# Patient Record
Sex: FEMALE | Race: WHITE | Employment: FULL TIME | URBAN - NONMETROPOLITAN AREA
[De-identification: names, ages, dates, MRNs, and addresses within clinical notes are randomized per-mention and may not be internally consistent; named-entity substitution may affect disease eponyms.]

---

## 2020-03-17 ENCOUNTER — TELEPHONE (OUTPATIENT)
Dept: FAMILY MEDICINE | Facility: OTHER | Age: 25
End: 2020-03-17

## 2020-03-17 NOTE — TELEPHONE ENCOUNTER
Please triage this patient.  She was on the schedule for headaches and cramping.  Please ask her to call her primary provider.

## 2020-03-17 NOTE — TELEPHONE ENCOUNTER
I called this patient with the following per Roni Chin PA-C:  Please call the patient to inform her that based off of the symptoms she reported she likely has a viral illness. While I appreciate the consideration of the [facility] system for her ongoing care, at this time I do not recommend that she be seen in clinic.      Please let her know that in response to the growing number of Covid-19 cases [place] has decided to restrict access to clinic to essential visits only. This is intended to protect patients from exposure to this or other viruses.      I would like the patient to do the following recommendations:     1. Maintain a bland diet (bananas, applesauce, rice, toast, etc)              A) Avoid irritating substances such as spicy  foods, fast food, alcohol, soda or similar             sugary beverages     2. Maintain adequate fluid intake (water preferred)      3. She may use bismuth subsalicylate or loperamide to help control symptoms of diarrhea and abdominal cramping.      4. Tylenol or ibuprofen for fever, pain, headache     The typical viral illness takes 7-10 days to run its course. This is the expected timeline for her symptoms to resolve.      Concerning symptoms would be worst headache of life, vomiting or defecating blood or increasing frequency of loose stools. Fever in excess of 100.4 F.      Thank-you,  Roni Chin PA-C on 3/17/2020 at 3:13 PM

## 2020-03-17 NOTE — TELEPHONE ENCOUNTER
Please call the patient to inform her that based off of the symptoms she reported she likely has a viral illness. While I appreciate the consideration of the [facility] system for her ongoing care, at this time I do not recommend that she be seen in clinic.     Please let her know that in response to the growing number of Covid-19 cases [place] has decided to restrict access to clinic to essential visits only. This is intended to protect patients from exposure to this or other viruses.     I would like the patient to do the following recommendations:    1. Maintain a bland diet (bananas, applesauce, rice, toast, etc)   A) Avoid irritating substances such as spicy  foods, fast food, alcohol, soda or similar  sugary beverages    2. Maintain adequate fluid intake (water preferred)     3. She may use bismuth subsalicylate or loperamide to help control symptoms of diarrhea and abdominal cramping.     4. Tylenol or ibuprofen for fever, pain, headache    The typical viral illness takes 7-10 days to run its course. This is the expected timeline for her symptoms to resolve.     Concerning symptoms would be worst headache of life, vomiting or defecating blood or increasing frequency of loose stools. Fever in excess of 100.4 F.     Thank-you,  Roni Chin PA-C on 3/17/2020 at 3:13 PM

## 2020-03-17 NOTE — TELEPHONE ENCOUNTER
Called and spoke to the patient. She said she started getting sick on Sunday. She thought maybe she was hung over but as the day went on her symptoms continued to get worse. Patient has been feeling hot and cold, some vomiting, diarrhea, headache, and dizziness. Cough but patient said she smokes so that is normal for her. No longer vomiting but does have diarrhea. Trying to push fluids. Urinating well. Feels weak. Has abdominal cramping across her entire lower abdomin. Feels like menstrual cramps but she does not have her period.     Patient really wants to be seen. Discussed clinic visit vs ED. Patient said she really does not want to be seen in the ED. She said she used to go to another clinic but moved to this area and does not have a primary provider. She is wondering if Roni would still see her today as she feels pretty crappy.     Will route to provider to advise.     Ruth Ann Abdi, QUYNHN, RN  [facility]

## 2020-03-23 ENCOUNTER — VIRTUAL VISIT (OUTPATIENT)
Dept: FAMILY MEDICINE | Facility: OTHER | Age: 25
End: 2020-03-23

## 2020-03-23 NOTE — PROGRESS NOTES
"Date: 2020 13:34:18  Clinician: Shira Mansfield  Clinician NPI: [id number]  Patient: Josefina Kim  Patient : 1995  Patient Address: [address] [place] [address]  Patient Phone: [phone]  Visit Protocol: URI  Patient Summary:  Josefina is a 25 year old ( : 1995 ) female who initiated a Visit for COVID-19 (Coronavirus) evaluation and screening. When asked the question \"Please sign me up to receive news, health information and promotions from [employer].\", Josefina responded \"No\".    Josefina states her symptoms started gradually 3-6 days ago.   Her symptoms consist of rhinitis, enlarged lymph nodes, facial pain or pressure, myalgia, a sore throat, a cough, nasal congestion, malaise, and a headache. She is experiencing difficulty breathing due to nasal congestion but she is not short of breath.   Symptom details     Nasal secretions: The color of her mucus is blood-tinged.    Cough: Josefina coughs every 5-10 minutes and her cough is not more bothersome at night. Phlegm comes into her throat when she coughs. She believes her cough is caused by post-nasal drip. The color of the phlegm is blood-tinged.     Sore throat: Josefina reports having moderate throat pain (4-6 on a 10 point pain scale), does not have exudate on her tonsils, and can swallow liquids. The lymph nodes in her neck are enlarged. A rash has not appeared on the skin since the sore throat started.     Facial pain or pressure: The facial pain or pressure does not feel worse when bending or leaning forward.     Headache: She states the headache is mild (1-3 on a 10 point pain scale).      Josefina denies having ear pain, wheezing, chills, teeth pain, and fever. She also denies double sickening (worsening symptoms after initial improvement), taking antibiotic medication for the symptoms, and having recent facial or sinus surgery in the past 60 days.   Precipitating events  Within the past week, Josefina has not been " exposed to someone with strep throat. She has not recently been exposed to someone with influenza. Josefina has not been in close contact with any high risk individuals.   Pertinent COVID-19 (Coronavirus) information  Josefina has not traveled internationally or to the areas where COVID-19 (Coronavirus) is widespread, including cruise ship travel in the last 14 days before the start of her symptoms.   Josefina has not had a close contact with a laboratory-confirmed COVID-19 patient within 14 days of symptom onset. She has had a close contact with a suspected COVID-19 patient within 14 days of symptom onset. Additional information about contact with COVID-19 (Coronavirus) patient as reported by the patient (free text): Coworker, Michael Capone, returned to work last week after traveling to [facility] on vacation, started getting out of breath, and is self quarantined to home for 14 days by his doctor.   Josefina is not a healthcare worker and does not work in a healthcare facility.   Pertinent medical history  Josefina does not get yeast infections when she takes antibiotics.   Josefina needs a return to work/school note.   Weight: 186 lbs   Josefina smokes or uses smokeless tobacco.   She denies pregnancy and denies breastfeeding. She has menstruated in the past month.   Weight: 186 lbs    MEDICATIONS: Valtrex oral, ALLERGIES: NKDA  Clinician Response:  Dear Josefina,   Based on the information you have provided, you do have symptoms that are consistent with Coronavirus (COVID-19).  The coronavirus causes mild to severe respiratory illness with the most common symptoms including fever, cough and difficulty breathing. Unfortunately, many viruses cause similar symptoms and it can be difficult to distinguish between viruses, especially in mild cases, so we are presuming that anyone with cough or fever has coronavirus at this time.  Coronavirus/COVID-19 has reached the point of community spread in [place], meaning that we  are finding the virus in people with no known exposure risk for kenton the virus. Given the increasing commonness of coronavirus in the community we are no longer testing patients who are not critically ill.  If you are a health care worker, you should refer to your employee health office for instructions about testing and returning to work.  For everyone else who has cough or fever, you should assume you are infected with coronavirus. Since you will not be tested but have symptoms that may be consistent with coronavirus, the CDC recommends you stay in self-isolation until these three things have happened:    You have had no fever for at least 72 hours (that is three full days of no fever without the use of medicine that reduces fevers)    AND   Other symptoms have improved (for example, when your cough or shortness of breath have improved)   AND   At least 7 days have passed since your symptoms first appeared.   How to Isolate:   Isolate yourself at home.  Do Not allow any visitors  Do Not go to work or school  Do Not go to [employer],  centers, shopping, or other public places.  Do Not shake hands.  Avoid close contact with others (hugging, kissing).   Protect Others:   Cover Your Mouth and Nose with a mask, disposable tissue or wash cloth to avoid spreading germs to others.  Wash your hands and face frequently with soap and water.   We know it can be scary to hear that you might have COVID-19. Our team can help track your symptoms and make sure you are doing ok over the next two weeks using a program called [employer] to keep in touch. When you receive an email from [employer], please consider enrolling in our monitoring program. There is no cost to you for monitoring. Here is a URL where you can learn more: [email or web address]  Managing Symptoms:   At this time, we primarily recommend Tylenol (Acetaminophen) for fever or pain. If you have liver or kidney problems, contact your primary  care provider for instructions on use of tylenol. Adults can take 650 mg (two 325 mg pills) by mouth every 4-6 hours as needed OR 1,000 mg (two 500 mg pills) every 8 hours as needed. MAXIMUM DAILY DOSE: 3,000mg. For children, refer to dosing on bottle based on age or weight.   If you develop significant shortness of breath that prevents you from doing normal activities, please call 911 or proceed to the nearest emergency room and alert them immediately that you have been in self-isolation for possible coronavirus.  For more information about COVID19 and options for caring for yourself at home, please visit the CDC website at https://www.cdc.gov/coronavirus/2019-ncov/about/steps-when-sick.htmlFor more options for care at [facility], please visit our website at [email or web address]    Diagnosis: Cough  Diagnosis ICD: R05

## 2020-03-27 ENCOUNTER — VIRTUAL VISIT (OUTPATIENT)
Dept: FAMILY MEDICINE | Facility: OTHER | Age: 25
End: 2020-03-27

## 2020-03-27 NOTE — PROGRESS NOTES
"Date: 2020 08:50:17  Clinician: Julian Little MD  Clinician NPI: [id number]  Patient: Josefina Kim  Patient : 1995  Patient Address: [address] [place] [address]  Patient Phone: [phone]  Visit Protocol: URI  Patient Summary:  Josefina is a 25 year old ( : 1995 ) female who initiated a Visit for COVID-19 (Coronavirus) evaluation and screening. When asked the question \"Please sign me up to receive news, health information and promotions from [employer].\", Josefina responded \"No\".    Josefina states her symptoms started gradually 7-9 days ago. After her symptoms started, they improved and then got worse again.   Her symptoms consist of a cough, malaise, a headache, myalgia, and chills. She is experiencing mild difficulty breathing with activities but can speak normally in full sentences. Josefina also feels feverish but was unable to measure her temperature.   Symptom details     Cough: Josefina coughs every 5-10 minutes and her cough is not more bothersome at night. Phlegm does not come into her throat when she coughs. She does not believe her cough is caused by post-nasal drip.     Headache: She states the headache is mild (1-3 on a 10 point pain scale).      Josefina denies having ear pain, rhinitis, wheezing, sore throat, nasal congestion, enlarged lymph nodes, teeth pain, and facial pain or pressure. She also denies taking antibiotic medication for the symptoms, having recent facial or sinus surgery in the past 60 days, and having a sinus infection within the past year.   Precipitating events  She has recently been exposed to someone with influenza. Josefina has not been in close contact with any high risk individuals.   Pertinent COVID-19 (Coronavirus) information  Josefina has not traveled internationally or to the areas where COVID-19 (Coronavirus) is widespread, including cruise ship travel in the last 14 days before the start of her symptoms.   Josefina has not had a " close contact with a laboratory-confirmed COVID-19 patient within 14 days of symptom onset. She has had a close contact with a suspected COVID-19 patient within 14 days of symptom onset. Additional information about contact with COVID-19 (Coronavirus) patient as reported by the patient (free text): Coworker returning to work from a trip to [facility]. Also, two people in my treatment group are quarantined to home due to suspected covid   Josefina is not a healthcare worker or a  and does not work in a healthcare facility. She does not live with a healthcare worker.   Pertinent medical history  Josefina does not get yeast infections when she takes antibiotics.   Josefina needs a return to work/school note.   Weight: 180 lbs   Josefina smokes or uses smokeless tobacco.   She denies pregnancy and denies breastfeeding. She has menstruated in the past month.   Additional information as reported by the patient (free text): Initially I thought it was possible that I had a cold and now my symptoms are different. My cough is dry and deep in my chest with nothing coming out. I've also been more dizzy than normal- I take supplements for being low on iron.   Weight: 180 lbs    MEDICATIONS: Valtrex oral, ALLERGIES: NKDA  Clinician Response:  Dear Josefina,   Based on the information you have provided, you do have symptoms that are consistent with Coronavirus (COVID-19).   The coronavirus causes mild to severe respiratory illness with the most common symptoms including fever, cough and difficulty breathing. Unfortunately, many viruses cause similar symptoms and it can be difficult to distinguish between viruses, especially in mild cases, so we are presuming that anyone with cough or fever has coronavirus at this time.  Coronavirus/COVID-19 has reached the point of community spread in [place], meaning that we are finding the virus in people with no known exposure risk for kenton the virus. Given the increasing  commonness of coronavirus in the community we are no longer testing patients who are not critically ill.  If you are a health care worker, you should refer to your employee health office for instructions about testing and returning to work.  For everyone else who has cough or fever, you should assume you are infected with coronavirus. Since you will not be tested but have symptoms that may be consistent with coronavirus, the CDC recommends you stay in self-isolation until these three things have happened:    You have had no fever for at least 72 hours (that is three full days of no fever without the use of medicine that reduces fevers)    AND   Other symptoms have improved (for example, when your cough or shortness of breath have improved)   AND   At least 7 days have passed since your symptoms first appeared.   How to Isolate:    Isolate yourself at home.   Do Not allow any visitors  Do Not go to work or school  Do Not go to [employer],  centers, shopping, or other public places.  Do Not shake hands.  Avoid close contact with others (hugging, kissing).   Protect Others:    Cover Your Mouth and Nose with a mask, disposable tissue or wash cloth to avoid spreading germs to others.  Wash your hands and face frequently with soap and water.   Managing Symptoms:    At this time, we primarily recommend Tylenol (Acetaminophen) for fever or pain. If you have liver or kidney problems, contact your primary care provider for instructions on use of tylenol. Adults can take 650 mg (two 325 mg pills) by mouth every 4-6 hours as needed OR 1,000 mg (two 500 mg pills) every 8 hours as needed. MAXIMUM DAILY DOSE: 3,000mg. For children, refer to dosing on bottle based on age or weight.   If you develop significant shortness of breath that prevents you from doing normal activities, please call 911 or proceed to the nearest emergency room and alert them immediately that you have been in self-isolation for possible coronavirus.   If  you have a higher risk medical condition such as cancer, heart failure, end stage renal disease on dialysis or have a transplant, please reach out to your specialist's clinic to advise them of your [facility] visit should you not improve within the next two days.  For more information about COVID19 and options for caring for yourself at home, please visit the CDC website at https://www.cdc.gov/coronavirus/2019-ncov/about/steps-when-sick.htmlFor more options for care at [facility], please visit our website at [email or web address]     Diagnosis: Cough  Diagnosis ICD: R05

## 2020-04-03 ENCOUNTER — VIRTUAL VISIT (OUTPATIENT)
Dept: FAMILY MEDICINE | Facility: CLINIC | Age: 25
End: 2020-04-03
Payer: COMMERCIAL

## 2020-04-03 ENCOUNTER — NURSE TRIAGE (OUTPATIENT)
Dept: NURSING | Facility: CLINIC | Age: 25
End: 2020-04-03

## 2020-04-03 DIAGNOSIS — R05.9 COUGH: Primary | ICD-10-CM

## 2020-04-03 PROCEDURE — 99213 OFFICE O/P EST LOW 20 MIN: CPT | Mod: TEL | Performed by: NURSE PRACTITIONER

## 2020-04-03 NOTE — LETTER
[address] [address]  Phone: [phone]  Fax: [phone]    April 3, 2020        Josefina Kim  [id number] [facility] [address]          To whom it may concern:    RE: Josefina Kim    Patient was evaluated by our clinic and missed work.  She is cleared to return to work on 4/10/2020.     Please contact me for questions or concerns.      Sincerely,        PEMA Linn CNP

## 2020-04-03 NOTE — Clinical Note
Please fax/print/send as needed.     Patient was wondering about my chart sign up?  Can we check on that as well?    Thanks    Millie

## 2020-04-03 NOTE — TELEPHONE ENCOUNTER
Pt called and stated that she had Respiratory symptoms and an cold. She had called [facility] and told to stay quarantined for 7 days, recalled after 7 days and still had symptom. Told to stay quarantined until 04/10/20. Pt asking for a note for her employer to RTWk. Sent to scheduling to assist and gave pt the My Chart #.Went over Covid 19 quarantined instructions.    Reason for Disposition    Health Information question, no triage required and triager able to answer question    Protocols used: INFORMATION ONLY CALL-A-AH      Please use the information at the end of this document to sign up for  [employer] [place] [employer] where you can get your results and a message about those results sent to you through the [employer] application. If you do not have mychart we will call you with your results but it may take longer.    Regardless of if you have been tested or not:  Patient who have symptoms (cough, fever, or shortness of breath), need to isolate for 7 days from when symptoms started AND 72 hours after fever resolves (without fever reducing medications) AND improvement of respiratory symptoms (whichever is longer).      Isolate yourself at home (in own room/own bathroom if possible)    Do Not allow any visitors    Do Not go to work or school    Do Not go to [employer],  centers, shopping, or other public places.    Do Not shake hands.    Avoid close and intimate contact with others (hugging, kissing).    Follow CDC recommendations for household cleaning of frequently touched services.     After the initial 7 days, continue to isolate yourself from household members as much as possible. To continue decrease the risk of community spread and exposure, you and any members of your household should limit activities in public for 14 days after starting home isolation.     Protect Others:    Cover Your Mouth and Nose with a mask, disposable tissue or wash cloth to avoid spreading germs to others.    Wash your hands and face  frequently with soap and water    Call Back If: Breathing difficulty develops or you become worse.  Leonor Casey RN

## 2020-04-03 NOTE — PROGRESS NOTES
"Subjective     Josefina Kim is a 25 year old female who is being evaluated via a billable telephone visit.      The patient has been notified of following:     \"This telephone visit will be conducted via a call between you and your physician/provider. We have found that certain health care needs can be provided without the need for a physical exam.  This service lets us provide the care you need with a short phone conversation.  If a prescription is necessary we can send it directly to your pharmacy.  If lab work is needed we can place an order for that and you can then stop by our lab to have the test done at a later time.    If during the course of the call the physician/provider feels a telephone visit is not appropriate, you will not be charged for this service.\"     Patient has given verbal consent for Telephone visit?  Yes    Josefina Kim complains of   Chief Complaint   Patient presents with     Patient Request for Note/Letter     Evaluated by [facility] for COVID symptoms on 3/23/2020 and 3/27/2020 and she was advised to stay out of work until 4/10/2020.  She needs a note to return to work after that.  Symptoms are improving.  Does not have a thermometer, but does not feel feverish.  Slight cough, this is improved.  Was short of breath last week, this has resolved. No other concerns today.     ALLERGIES  Patient has no known allergies.           Review of Systems   ROS COMP: Constitutional, HEENT, cardiovascular, pulmonary, gi and gu systems are negative, except as otherwise noted.               Assessment/Plan:  1. Cough  Discussed COVID recommendations for returning to work.  Note given to return on 4/10/202 as previously advised.  She will contact the clinic with any new or worsening symptoms.       No follow-ups on file.      Phone call duration:  6 minutes    PEMA Linn CNP      "

## 2020-08-04 ENCOUNTER — VIRTUAL VISIT (OUTPATIENT)
Dept: FAMILY MEDICINE | Facility: CLINIC | Age: 25
End: 2020-08-04
Payer: COMMERCIAL

## 2020-08-04 VITALS — TEMPERATURE: 100.3 F

## 2020-08-04 DIAGNOSIS — R50.9 FEVER AND CHILLS: Primary | ICD-10-CM

## 2020-08-04 PROCEDURE — 99213 OFFICE O/P EST LOW 20 MIN: CPT | Mod: 95 | Performed by: PHYSICIAN ASSISTANT

## 2020-08-04 RX ORDER — VALACYCLOVIR HYDROCHLORIDE 1 G/1
1 TABLET, FILM COATED ORAL DAILY
COMMUNITY
Start: 2020-07-16

## 2020-08-04 SDOH — HEALTH STABILITY: MENTAL HEALTH: HOW OFTEN DO YOU HAVE A DRINK CONTAINING ALCOHOL?: NEVER

## 2020-08-04 ASSESSMENT — PAIN SCALES - GENERAL: PAINLEVEL: NO PAIN (0)

## 2020-08-04 NOTE — LETTER
August 4, 2020      Josefina Kim  [id number] [facility] [address]        To Whom It May Concern,       Josefina was evaluated today, via televisit, for fever and advised to be screened for covid. She has been instructed to quarantine and remain home from work until the covid screen returns. Thankyou for your understanding.       Sincerely,        Roni Chin PA-C

## 2020-08-04 NOTE — NURSING NOTE
Health Maintenance Due   Topic Date Due     PREVENTIVE CARE VISIT  1995     CHLAMYDIA SCREENING  1995     DTAP/TDAP/TD IMMUNIZATION (1 - Tdap) 02/01/2003     HPV IMMUNIZATION (1 - 2-dose series) 02/01/2007     HIV SCREENING  02/01/2011     PAP  02/01/2017     PHQ-2  01/01/2020     Mitzi ARITA LPN

## 2020-08-04 NOTE — PROGRESS NOTES
"Josefina Kim is a 25 year old female who is being evaluated via a billable telephone visit.      The patient has been notified of following:     \"This telephone visit will be conducted via a call between you and your physician/provider. We have found that certain health care needs can be provided without the need for a physical exam.  This service lets us provide the care you need with a short phone conversation.  If a prescription is necessary we can send it directly to your pharmacy.  If lab work is needed we can place an order for that and you can then stop by our lab to have the test done at a later time.    Telephone visits are billed at different rates depending on your insurance coverage. During this emergency period, for some insurers they may be billed the same as an in-person visit.  Please reach out to your insurance provider with any questions.    If during the course of the call the physician/provider feels a telephone visit is not appropriate, you will not be charged for this service.\"    Patient has given verbal consent for Telephone visit?  Yes    What phone number would you like to be contacted at? [phone]    How would you like to obtain your AVS? Toddhart    Subjective     Josefina Kim is a 25 year old female who presents via phone visit today for the following health issues:    HPI    Acute Illness   Acute illness concerns: fever  Onset: today    Fever: YES    Chills/Sweats: YES    Headache (location?): YES - left side forehead    Sinus Pressure:no    Conjunctivitis:  no    Ear Pain: no    Rhinorrhea: no    Congestion: no    Sore Throat: no     Cough: no    Wheeze: no    Decreased Appetite: no    Nausea: no    Vomiting: no    Diarrhea:  no    Dysuria/Freq.: no    Fatigue/Achiness: YES    Sick/Strep Exposure: no     Therapies Tried and outcome: ibuprofen, no change    Patient is a 25 year old female who calls in today with concern about a fever. She says that she has an appointment " with dentist on Monday to evaluate wisdom teeth for removal. Patient believes that her fever is due to the stress/pain with the teeth. She has been using ibuprofen with successful reduction of the fever. Employer will not let her return to work without clearance for covid. Denies exposure to sick individuals.       Reviewed and updated as needed this visit by Provider         Review of Systems   Constitutional, HEENT, cardiovascular, pulmonary, gi and gu systems are negative, except as otherwise noted.       Objective   Reported vitals:  There were no vitals taken for this visit.   healthy, alert and no distress  PSYCH: Alert and oriented times 3; coherent speech, normal   rate and volume, able to articulate logical thoughts, able   to abstract reason, no tangential thoughts, no hallucinations   or delusions  Her affect is normal  RESP: No cough, no audible wheezing, able to talk in full sentences  Remainder of exam unable to be completed due to telephone visits    Diagnostic Test Results:  Labs reviewed in Epic        Assessment/Plan:    1. Fever and chills  Patient will be scheduled for a covid screen. Will remain out of work and quarantined from friends, family, etc until negative screen returns. Letter provided to inform employer of the testing. Patient may continue to use anti-pyretic of her choice to manage the fever.   - Symptomatic COVID-19 Virus (Coronavirus) by PCR; Future    Return in about 4 months (around 12/4/2020) for Return for scheduled annual checkup with PCP.      Phone call duration:  8 minutes    Roni Chin PA-C

## 2020-08-05 DIAGNOSIS — R50.9 FEVER AND CHILLS: ICD-10-CM

## 2020-08-05 PROCEDURE — U0003 INFECTIOUS AGENT DETECTION BY NUCLEIC ACID (DNA OR RNA); SEVERE ACUTE RESPIRATORY SYNDROME CORONAVIRUS 2 (SARS-COV-2) (CORONAVIRUS DISEASE [COVID-19]), AMPLIFIED PROBE TECHNIQUE, MAKING USE OF HIGH THROUGHPUT TECHNOLOGIES AS DESCRIBED BY CMS-2020-01-R: HCPCS | Performed by: PHYSICIAN ASSISTANT

## 2020-08-06 LAB
SARS-COV-2 RNA SPEC QL NAA+PROBE: NOT DETECTED
SPECIMEN SOURCE: NORMAL

## 2020-12-18 ENCOUNTER — VIRTUAL VISIT (OUTPATIENT)
Dept: FAMILY MEDICINE | Facility: CLINIC | Age: 25
End: 2020-12-18
Payer: COMMERCIAL

## 2020-12-18 DIAGNOSIS — R21 RASH AND NONSPECIFIC SKIN ERUPTION: Primary | ICD-10-CM

## 2020-12-18 PROCEDURE — 99213 OFFICE O/P EST LOW 20 MIN: CPT | Mod: 95 | Performed by: PHYSICIAN ASSISTANT

## 2020-12-18 RX ORDER — PREDNISONE 20 MG/1
20 TABLET ORAL DAILY
Qty: 5 TABLET | Refills: 0 | Status: SHIPPED | OUTPATIENT
Start: 2020-12-18 | End: 2021-02-11

## 2020-12-18 RX ORDER — CETIRIZINE HYDROCHLORIDE 10 MG/1
10 TABLET ORAL DAILY
Qty: 30 TABLET | Refills: 0 | Status: SHIPPED | OUTPATIENT
Start: 2020-12-18 | End: 2021-02-11

## 2020-12-18 NOTE — NURSING NOTE
Health Maintenance Due   Topic Date Due     PREVENTIVE CARE VISIT  1996     CHLAMYDIA SCREENING  1996     Pneumococcal Vaccine: Pediatrics (0 to 5 Years) and At-Risk Patients (6 to 64 Years) (1 of 1 - PPSV23) 02/01/2002     DTAP/TDAP/TD IMMUNIZATION (1 - Tdap) 02/01/2003     HPV IMMUNIZATION (1 - 2-dose series) 02/01/2007     HIV SCREENING  02/01/2011     HEPATITIS C SCREENING  02/01/2014     PAP  02/01/2017     INFLUENZA VACCINE (1) 09/01/2020     Mitzi ARITA LPN
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
## 2020-12-18 NOTE — PATIENT INSTRUCTIONS
Patient Education     Atopic Dermatitis (Adult)  Atopic dermatitis is a dry, itchy, red rash. It s also called eczema. The rash is chronic, or ongoing. It can come and go over time. The disease is often passed down in families. It causes a problem with the skin barrier that makes the skin more sensitive to the environment and other factors. The increased skin sensitivity causes an itch, which causes scratching. Scratching can worsen the itching or also break the skin. This can put the skin at risk of infection.   The condition is most common in people with asthma, hay fever, hives, or dry or sensitive skin. The rash may be caused by extreme heat or heavy sweating. Skin irritants can cause the rash to flare up. These can include wool or silk clothing, grease, oils, some medicines, and harsh soaps and detergents. Emotional stress can also be a trigger.   Treatment is done to relieve the itching and inflammation of the skin. This is often done with home care and over-the-counter treatments. Your healthcare provider may prescribe other treatments.   Home care  Follow these tips to care for your condition:    Keep the areas of rash clean by bathing at least every other day. Use lukewarm water to bathe. Don t use hot water, which can dry out the skin.    Don t use soaps with strong detergents. Use mild soaps made for sensitive skin.    Apply a cream or ointment to damp skin right after bathing.    Avoid things that irritate your skin. Wear absorbent, soft fabrics next to the skin rather than rough or scratchy materials.    Use mild laundry soap free of scents and perfumes. Make sure to rinse all the soap out of your clothes.    Treat any skin infection as directed.    Use oral diphenhydramine to help reduce itching. This is an antihistamine you can buy at drug and grocery stores. It can make you sleepy, so use lower doses during the daytime. Be cautious of driving or operating machinery. Or you can use loratadine or  other antihistamines that will not make you sleepy. Don't use diphenhydramine if you have glaucoma or have trouble urinating due to an enlarged prostate.  Follow-up care  See your healthcare provider, or as advised. If your symptoms don t get better or if they get worse in the next 7 days, make an appointment with your healthcare provider.   When to seek medical advice  Call your healthcare provider right away  if any of these occur:    Increasing area of redness or pain in the skin    Yellow crusts or wet drainage from the rash    Fever of 100.4 F (38 C) or higher, or as directed by your healthcare provider  [place] last reviewed this educational content on 8/1/2019 2000-2020 [employer]. [address] [place] [address], [place] [address]. All rights reserved. This information is not intended as a substitute for professional medical care. Always follow your healthcare professional's instructions.

## 2020-12-21 ENCOUNTER — TELEPHONE (OUTPATIENT)
Dept: DERMATOLOGY | Facility: CLINIC | Age: 25
End: 2020-12-21

## 2020-12-21 NOTE — TELEPHONE ENCOUNTER
[facility] Call Center    Phone Message    May a detailed message be left on voicemail: yes     Reason for Call: Appointment Intake    Referring Provider Name: Roni Amorert  Diagnosis and/or Symptoms: Rash and nonspecific skin eruption     Action Taken: Message routed to:  Adult Clinics: Dermatology p [id number]    Travel Screening: Not Applicable

## 2020-12-21 NOTE — TELEPHONE ENCOUNTER
Left voicemail for patient to help get scheduled to evaluate rash. Currently taking Prednisone and Zyrtec. Provided direct clinic phone number to return call.    Myranda Corrales LPN

## 2020-12-28 NOTE — TELEPHONE ENCOUNTER
Patient was seen last Monday and was given a few things to try. Will wait to schedule with us if symptoms return. OK to schedule virtual visit with photos if patient calls back in future.      Myranda Corrales LPN

## 2021-01-04 ENCOUNTER — HEALTH MAINTENANCE LETTER (OUTPATIENT)
Age: 26
End: 2021-01-04

## 2021-02-11 ENCOUNTER — OFFICE VISIT (OUTPATIENT)
Dept: DERMATOLOGY | Facility: CLINIC | Age: 26
End: 2021-02-11
Payer: COMMERCIAL

## 2021-02-11 DIAGNOSIS — L20.84 INTRINSIC ECZEMA: ICD-10-CM

## 2021-02-11 DIAGNOSIS — L50.3 DERMATOGRAPHISM: Primary | ICD-10-CM

## 2021-02-11 PROBLEM — F19.11 HISTORY OF SUBSTANCE ABUSE (H): Status: ACTIVE | Noted: 2019-05-30

## 2021-02-11 PROBLEM — L20.9 ATOPIC DERMATITIS: Status: ACTIVE | Noted: 2020-12-21

## 2021-02-11 PROCEDURE — 99204 OFFICE O/P NEW MOD 45 MIN: CPT | Performed by: DERMATOLOGY

## 2021-02-11 RX ORDER — FLUOCINONIDE 0.5 MG/G
OINTMENT TOPICAL
Qty: 120 G | Refills: 11 | Status: SHIPPED | OUTPATIENT
Start: 2021-02-11

## 2021-02-11 RX ORDER — ACETAMINOPHEN 325 MG/1
325-650 TABLET ORAL
COMMUNITY

## 2021-02-11 RX ORDER — CETIRIZINE HYDROCHLORIDE 10 MG/1
10 TABLET ORAL DAILY
Qty: 30 TABLET | Refills: 11 | Status: SHIPPED | OUTPATIENT
Start: 2021-02-11

## 2021-02-11 RX ORDER — FLUOCINONIDE 0.5 MG/G
CREAM TOPICAL
COMMUNITY
Start: 2019-05-30

## 2021-02-11 RX ORDER — IBUPROFEN 600 MG/1
TABLET, FILM COATED ORAL
COMMUNITY
Start: 2020-08-12

## 2021-02-11 ASSESSMENT — PAIN SCALES - GENERAL: PAINLEVEL: NO PAIN (0)

## 2021-02-11 NOTE — PROGRESS NOTES
[facility] Dermatology Note  Encounter Date: Feb 11, 2021  Office Visit     Dermatology Problem List:  1. Eczema  - previous tx: emollients, benadryl, zyrtec, fluocinonide cr, prednisone   - current tx: moisturizer daily and fluocinolone 0.05% ointment  2. Dermatographism  - current tx: Zytrec 10mg daily  3. Mild hidradenitis in underarms + ICD or ACD to deodorants  - current tx: benzoyl peroxide wash    ____________________________________________    ASSESSMENT/PLAN:  # Eczema. Chronic, flared as not currently treated. Discussed the pathogenesis of this condition and emphasized the importance of gentle skin care and cream based moisturizer for long term treatment. Discussed use of topical steroid only when skin is itchy/symptomatic.    - Apply a moisturizer 1-2 times daily   - Switch to Dove Bar Soap in the shower   - Start fluocinolone 0.05% ointment BID PRN itch    # Dermatographism. Unclear if will be acute or chronic at this time as it is a new diagnosis.    - Take Zytrec 10 daily    # Mild HS with ICD or ACD to deodorant in the underarms: Chronic, flared.   - Recommended OTC benzoyl peroxide wash daily in the shower to the underarms to decrease bacteria counts   - Recommended switching to gentler deodorant - [employer] [place], Almay, Lume.    Procedures Performed:   None.    Follow-up: in one month if not improved, yearly for refills.    Staff and Scribe:     Scribe Disclosure:   I, Scotty Aguilar, am serving as a scribe to document services personally performed by this physician, Dr. Shira Camarillo, based on data collection and the provider's statements to me.     Provider Disclosure:   The documentation recorded by the scribe accurately reflects the services I personally performed and the decisions made by me.    Shira Camarillo MD    Department of Dermatology  [facility]: Phone: [phone],  "Fax:[phone]  [facility]: Phone: [phone], Fax: [phone]    ____________________________________________    CC: Derm Problem (Patient reports body wide rash, blotchy with clear drainage, severe itching; had it for about 5 months now (but on and off her whole life). Rash has been getting worse despite use of home therapies such as emollients, benadryl, zrytec, fluocinonide etc. Patient reported that she finished a course of Prednisone, which did clear up the rash a lot. No new exposures, medicines or travel. Does experience hives from stress. Referred by Roni Chin PA-C [facility]. ) and New Patient (No personal or family hx of skin cancer. Main areas of focus today are axillae, arms and neck. )    HPI:  Ms. Josefina Kim is a(n) 26 year old female who presents today as a new patient for a skin check.    She is new to our department. She has not seen a dermatologist prior. She has no history of skin cancer, atypical moles, or precancerous lesions to her knowledge.    Referred by Roni Chin PA-C for a rash and nonspecific skin eruption. He was started on prednisone 20mg daily for 5 days and Zyrtec 10 mg daily for itching. Referral to dermatology was placed \"in the event that the above treatments are ineffective at resolving the rash\".    Today, patient reports this rash has been present as long as she can remember but has worsened in the past 5 months. These spots come and go without any contributing factors. This is a body wide rash that is blotchy with clear drainage and severe itching. She's been using emollients, benadryl, zyrtec and fluocinonide with no relief in the past. She recently finished a round of prednisone, which cleared her skin. She is no longer taking zyrtec, only did for 5 days as she thinks it made her tired. She's been using a Hemp-Z moisturizer. She denies any new exposures, medicines or " travel.    Her underarms are itchy and she has used Deoderant. She's tried changing Deoderant without improvement.    Patient is otherwise feeling well, without additional concerns.    Labs:  NA    Physical exam:  Vitals: There were no vitals taken for this visit.  SKIN: Focused examination of the upper extremities, axilla, chest, abdomen, back and lower extremities was performed.  - faint eczematous derm on left upper medial arm faint hyperpigmetation on left antecubital fossa  - eczematous dermatitis in the right antecubital fossa  - small amount of eczematous derm on right upper back  - scratch test for dermatographism is positive  - scattered eczematous dermatitis on anterior chest  - inflammatory papules in axilla, bilaterally  - No other lesions of concern on areas examined.     Medications:  Current Outpatient Medications   Medication     cetirizine (ZYRTEC) 10 MG tablet     predniSONE (DELTASONE) 20 MG tablet     valACYclovir (VALTREX) 1000 mg tablet     No current facility-administered medications for this visit.       Past Medical History:   There is no problem list on file for this patient.    No past medical history on file.     CC No referring provider defined for this encounter. on close of this encounter.

## 2021-02-11 NOTE — NURSING NOTE
Josefina Kim's goals for this visit include:   Chief Complaint   Patient presents with     Derm Problem     Patient reports body wide rash, blotchy with clear drainage, severe itching; had it for about 5 months now (but on and off her whole life). Rash has been getting worse despite use of home therapies such as emollients, benadryl, zrytec, fluocinonide etc. Patient reported that she finished a course of Prednisone, which did clear up the rash a lot. No new exposures, medicines or travel. Does experience hives from stress. Referred by Roni Chin PA-C [facility].      New Patient     No personal or family hx of skin cancer. Main areas of focus today are axillae, arms and neck.        She requests these members of her care team be copied on today's visit information: Yes     [facility]    Referring Provider:  No referring provider defined for this encounter.    There were no vitals taken for this visit.    Do you need any medication refills at today's visit? No   [id number], MEDICAL ASSISTANT

## 2021-02-11 NOTE — PATIENT INSTRUCTIONS
- I recommend using a moisturizer daily, such as Cetaphil, CeraVe, Vanicream (cream based). Use this twice a day in the winter and once in the summer months. Apply after getting out of the shower.  - Switch to the Dove Bar for Sensitive Skin Unscented  - Apply fluocinolone twice daily as needed for itch. If you have no itchy spots, then you should avoid using the fluocinolone.    - Try to avoid using Deoderant for now. You can try [employer] [place], Lume or Almay as a gentle Deoderant.  - You can also use a benzoyl peroxide wash, I recommend Neutrogena clear Pore, to the underarms in the shower. Leave this to the underarms for 30 seconds before washing off.  - Take 10 mg zyrtec daily

## 2021-02-11 NOTE — LETTER
2/11/2021         RE: Josefina Kim  [address] [facility] [address]        Dear Colleague,    Thank you for referring your patient, Josefina Kim, to the [facility]. Please see a copy of my visit note below.    [facility] Dermatology Note  Encounter Date: Feb 11, 2021  Office Visit     Dermatology Problem List:  1. Eczema  - previous tx: emollients, benadryl, zyrtec, fluocinonide cr, prednisone   - current tx: moisturizer daily and fluocinolone 0.05% ointment  2. Dermatographism  - current tx: Zytrec 10mg daily  3. Mild hidradenitis in underarms + ICD or ACD to deodorants  - current tx: benzoyl peroxide wash    ____________________________________________    ASSESSMENT/PLAN:  # Eczema. Chronic, flared as not currently treated. Discussed the pathogenesis of this condition and emphasized the importance of gentle skin care and cream based moisturizer for long term treatment. Discussed use of topical steroid only when skin is itchy/symptomatic.    - Apply a moisturizer 1-2 times daily   - Switch to Dove Bar Soap in the shower   - Start fluocinolone 0.05% ointment BID PRN itch    # Dermatographism. Unclear if will be acute or chronic at this time as it is a new diagnosis.    - Take Zytrec 10 daily    # Mild HS with ICD or ACD to deodorant in the underarms: Chronic, flared.   - Recommended OTC benzoyl peroxide wash daily in the shower to the underarms to decrease bacteria counts   - Recommended switching to gentler deodorant - [employer] [place], [place], [place].    Procedures Performed:   None.    Follow-up: in one month if not improved, yearly for refills.    Staff and Scribe:     Scribe Disclosure:   I, Scotty Aguilar, am serving as a scribe to document services personally performed by this physician, Dr. Shira Camarillo, based on data collection and the provider's statements to me.     Provider Disclosure:   The documentation recorded by the scribe  "accurately reflects the services I personally performed and the decisions made by me.    Shira Camarillo MD    Department of Dermatology  [facility]: Phone: [phone], Fax:[phone]  [facility]: Phone: [phone], Fax: [phone]    ____________________________________________    CC: Derm Problem (Patient reports body wide rash, blotchy with clear drainage, severe itching; had it for about 5 months now (but on and off her whole life). Rash has been getting worse despite use of home therapies such as emollients, benadryl, zrytec, fluocinonide etc. Patient reported that she finished a course of Prednisone, which did clear up the rash a lot. No new exposures, medicines or travel. Does experience hives from stress. Referred by Roni Chin PA-C [facility]. ) and New Patient (No personal or family hx of skin cancer. Main areas of focus today are axillae, arms and neck. )    HPI:  Ms. Josefina Kim is a(n) 26 year old female who presents today as a new patient for a skin check.    She is new to our department. She has not seen a dermatologist prior. She has no history of skin cancer, atypical moles, or precancerous lesions to her knowledge.    Referred by Roni Chin PA-C for a rash and nonspecific skin eruption. He was started on prednisone 20mg daily for 5 days and Zyrtec 10 mg daily for itching. Referral to dermatology was placed \"in the event that the above treatments are ineffective at resolving the rash\".    Today, patient reports this rash has been present as long as she can remember but has worsened in the past 5 months. These spots come and go without any contributing factors. This is a body wide rash that is blotchy with clear drainage and severe itching. She's been using emollients, benadryl, zyrtec and fluocinonide with no relief " in the past. She recently finished a round of prednisone, which cleared her skin. She is no longer taking zyrtec, only did for 5 days as she thinks it made her tired. She's been using a Hemp-Z moisturizer. She denies any new exposures, medicines or travel.    Her underarms are itchy and she has used Deoderant. She's tried changing Deoderant without improvement.    Patient is otherwise feeling well, without additional concerns.    Labs:  NA    Physical exam:  Vitals: There were no vitals taken for this visit.  SKIN: Focused examination of the upper extremities, axilla, chest, abdomen, back and lower extremities was performed.  - faint eczematous derm on left upper medial arm faint hyperpigmetation on left antecubital fossa  - eczematous dermatitis in the right antecubital fossa  - small amount of eczematous derm on right upper back  - scratch test for dermatographism is positive  - scattered eczematous dermatitis on anterior chest  - inflammatory papules in axilla, bilaterally  - No other lesions of concern on areas examined.     Medications:  Current Outpatient Medications   Medication     cetirizine (ZYRTEC) 10 MG tablet     predniSONE (DELTASONE) 20 MG tablet     valACYclovir (VALTREX) 1000 mg tablet     No current facility-administered medications for this visit.       Past Medical History:   There is no problem list on file for this patient.    No past medical history on file.     CC No referring provider defined for this encounter. on close of this encounter.      Again, thank you for allowing me to participate in the care of your patient.        Sincerely,        Shira Camarillo MD

## 2021-02-17 ENCOUNTER — TELEPHONE (OUTPATIENT)
Dept: DERMATOLOGY | Facility: CLINIC | Age: 26
End: 2021-02-17

## 2021-02-17 NOTE — TELEPHONE ENCOUNTER
Received a fax for an alternative to Fluocinolone 0.05% ointment as it is expensive per [facility] pharmacy . RN called pt and notified pt of [facility]. Pt verbalized understanding and states she has heard of this and will use the coupon. No further questions at this time per pt...Dafne Claudio RN

## 2021-04-06 ENCOUNTER — E-VISIT (OUTPATIENT)
Dept: PEDIATRICS | Facility: CLINIC | Age: 26
End: 2021-04-06
Payer: COMMERCIAL

## 2021-04-06 DIAGNOSIS — J06.9 UPPER RESPIRATORY TRACT INFECTION, UNSPECIFIED TYPE: Primary | ICD-10-CM

## 2021-04-06 DIAGNOSIS — J06.9 VIRAL URI: ICD-10-CM

## 2021-04-06 DIAGNOSIS — R07.0 THROAT PAIN: ICD-10-CM

## 2021-04-06 PROCEDURE — 99207 PR NO BILLABLE SERVICE THIS VISIT: CPT | Performed by: PHYSICIAN ASSISTANT

## 2021-04-06 NOTE — PATIENT INSTRUCTIONS
I have ordered covid and strep testing for you. Please be seen at your nearest [place] lab for testing   The symptoms you describe suggest a viral cause, which is much more common than a bacterial cause. Antibiotics will treat bacterial infections, but have no effect on viral infections. If possible, especially if improving, start with symptom care for the first 7-10 days, then consider seeking further treatment or taking an antibiotic. Bacterial infections generally are more severe, including symptoms such as pus, fever over 101degrees F, or rapidly worsening.

## 2021-04-17 ENCOUNTER — HOSPITAL ENCOUNTER (EMERGENCY)
Facility: CLINIC | Age: 26
Discharge: HOME | End: 2021-04-17
Attending: NURSE PRACTITIONER | Admitting: NURSE PRACTITIONER
Payer: COMMERCIAL

## 2021-04-17 VITALS
HEART RATE: 91 BPM | SYSTOLIC BLOOD PRESSURE: 145 MMHG | WEIGHT: 200 LBS | TEMPERATURE: 98.1 F | DIASTOLIC BLOOD PRESSURE: 99 MMHG | RESPIRATION RATE: 18 BRPM | OXYGEN SATURATION: 97 %

## 2021-04-17 DIAGNOSIS — M54.50 ACUTE RIGHT-SIDED LOW BACK PAIN WITHOUT SCIATICA: ICD-10-CM
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
## 2021-04-17 DIAGNOSIS — M54.6 ACUTE RIGHT-SIDED THORACIC BACK PAIN: ICD-10-CM

## 2021-04-17 PROCEDURE — 99283 EMERGENCY DEPT VISIT LOW MDM: CPT | Performed by: NURSE PRACTITIONER

## 2021-04-17 PROCEDURE — 250N000013 HC RX MED GY IP 250 OP 250 PS 637: Performed by: NURSE PRACTITIONER

## 2021-04-17 RX ORDER — CYCLOBENZAPRINE HCL 10 MG
10 TABLET ORAL 3 TIMES DAILY PRN
Qty: 12 TABLET | Refills: 0 | Status: SHIPPED | OUTPATIENT
Start: 2021-04-17

## 2021-04-17 RX ORDER — CYCLOBENZAPRINE HCL 10 MG
10 TABLET ORAL 3 TIMES DAILY PRN
Status: DISCONTINUED | OUTPATIENT
Start: 2021-04-17 | End: 2021-04-17 | Stop reason: HOSPADM

## 2021-04-17 RX ORDER — CYCLOBENZAPRINE HCL 10 MG
10 TABLET ORAL 3 TIMES DAILY PRN
Qty: 1 TABLET | Refills: 0 | Status: SHIPPED | OUTPATIENT
Start: 2021-04-17

## 2021-04-17 RX ORDER — CYCLOBENZAPRINE HCL 10 MG
10 TABLET ORAL ONCE
Status: COMPLETED | OUTPATIENT
Start: 2021-04-17 | End: 2021-04-17

## 2021-04-17 RX ADMIN — CYCLOBENZAPRINE 10 MG: 10 TABLET, FILM COATED ORAL at 21:10

## 2021-04-18 NOTE — DISCHARGE INSTRUCTIONS
Ice today, ok to switch to heat tomorrow.  Tylenol 650 mg every 4-6 hours as needed for pain.  Ibuprofen 400-600 mg every 6-8 hours as needed for pain  (take with food, stop if causing stomach pains.)  Flexeril 10 mg 3 times a day as needed (muscle relaxer).  Recheck for headaches, dizziness, chest pain, shortness of breath, abdominal pain, or any worsening symptoms.
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
## 2021-06-21 NOTE — TELEPHONE ENCOUNTER
"Routing refill request to provider for review/approval because:  Medication is reported/historical      Requested Prescriptions   Pending Prescriptions Disp Refills     valACYclovir (VALTREX) 1000 mg tablet 30 tablet 1     Sig: Take 1 tablet (1,000 mg) by mouth daily   Last Written Prescription Date:  NA  Last Fill Quantity: na,  # refills: NA   Last office visit: 12/18/2020  Future Office Visit:        Antivirals for Herpes Protocol Failed - 6/18/2021  3:10 PM        Failed - Normal serum creatinine on file in past 12 months     No lab results found.    Ok to refill medication if creatinine is low          Passed - Patient is age 12 or older        Passed - Recent (12 mo) or future (30 days) visit within the authorizing provider's specialty     Patient has had an office visit with the authorizing provider or a provider within the authorizing providers department within the previous 12 mos or has a future within next 30 days. See \"Patient Info\" tab in inbasket, or \"Choose Columns\" in Meds & Orders section of the refill encounter.              Passed - Medication is active on med list         Geneva Toledo RN    "

## 2021-06-22 RX ORDER — VALACYCLOVIR HYDROCHLORIDE 1 G/1
1000 TABLET, FILM COATED ORAL DAILY
Qty: 30 TABLET | Refills: 1 | OUTPATIENT
Start: 2021-06-22

## 2021-06-23 NOTE — TELEPHONE ENCOUNTER
Review of records shows that this medication was not discussed at appointment in Dec 2020 nor does it seem to be have been discussed in any of the visits over the past year. Patient would need an appointment prior to sending out any medication.    Roni Chin PA-C on 6/22/2021 at 10:36 PM

## 2021-06-25 NOTE — TELEPHONE ENCOUNTER
Spoke to patient and relayed message below, she had an appointment in [place] and this is taken care of.    Kim Lopez on 6/25/2021 at 10:50 AM

## 2021-10-10 ENCOUNTER — HEALTH MAINTENANCE LETTER (OUTPATIENT)
Age: 26
End: 2021-10-10

## 2022-01-30 ENCOUNTER — HEALTH MAINTENANCE LETTER (OUTPATIENT)
Age: 27
End: 2022-01-30

## 2022-09-24 ENCOUNTER — HEALTH MAINTENANCE LETTER (OUTPATIENT)
Age: 27
End: 2022-09-24

## 2023-05-08 ENCOUNTER — HEALTH MAINTENANCE LETTER (OUTPATIENT)
Age: 28
End: 2023-05-08